# Patient Record
Sex: MALE | Race: WHITE | HISPANIC OR LATINO | URBAN - METROPOLITAN AREA
[De-identification: names, ages, dates, MRNs, and addresses within clinical notes are randomized per-mention and may not be internally consistent; named-entity substitution may affect disease eponyms.]

---

## 2023-05-24 PROBLEM — 197321007: Status: ACTIVE | Noted: 2023-05-24

## 2023-05-26 ENCOUNTER — WEB ENCOUNTER (OUTPATIENT)
Dept: URBAN - METROPOLITAN AREA CLINIC 63 | Facility: CLINIC | Age: 23
End: 2023-05-26

## 2023-05-26 ENCOUNTER — LAB OUTSIDE AN ENCOUNTER (OUTPATIENT)
Dept: URBAN - METROPOLITAN AREA CLINIC 63 | Facility: CLINIC | Age: 23
End: 2023-05-26

## 2023-05-26 ENCOUNTER — OFFICE VISIT (OUTPATIENT)
Dept: URBAN - METROPOLITAN AREA CLINIC 63 | Facility: CLINIC | Age: 23
End: 2023-05-26
Payer: MEDICAID

## 2023-05-26 VITALS
SYSTOLIC BLOOD PRESSURE: 100 MMHG | OXYGEN SATURATION: 99 % | HEART RATE: 75 BPM | DIASTOLIC BLOOD PRESSURE: 68 MMHG | WEIGHT: 178 LBS | TEMPERATURE: 97 F | BODY MASS INDEX: 23.59 KG/M2 | HEIGHT: 73 IN

## 2023-05-26 DIAGNOSIS — R10.9 ABDOMINAL CRAMPING: ICD-10-CM

## 2023-05-26 DIAGNOSIS — K21.9 GASTROESOPHAGEAL REFLUX DISEASE WITHOUT ESOPHAGITIS: ICD-10-CM

## 2023-05-26 DIAGNOSIS — R10.13 DYSPEPSIA: ICD-10-CM

## 2023-05-26 DIAGNOSIS — K92.1 HEMATOCHEZIA: ICD-10-CM

## 2023-05-26 DIAGNOSIS — R19.7 DIARRHEA, UNSPECIFIED TYPE: ICD-10-CM

## 2023-05-26 DIAGNOSIS — K76.0 FATTY LIVER: ICD-10-CM

## 2023-05-26 PROBLEM — 266435005: Status: ACTIVE | Noted: 2023-05-26

## 2023-05-26 PROCEDURE — 99204 OFFICE O/P NEW MOD 45 MIN: CPT | Performed by: PHYSICIAN ASSISTANT

## 2023-05-26 RX ORDER — ONDANSETRON HYDROCHLORIDE 4 MG/1
1 TABLET TABLET, FILM COATED ORAL
Qty: 2 | Refills: 0 | OUTPATIENT
Start: 2023-05-26

## 2023-05-26 RX ORDER — CHOLESTYRAMINE 4 G/9G
1 PACKET MIXED WITH WATER OR WITH MEAL. TAKE OTHER DRUGS AT LEAST 1 HR BEFORE OR 4-6 HOURS AFTER TAKING MEDICATION TO MINIMIZE INTERFERENCE POWDER, FOR SUSPENSION ORAL ONCE A DAY
Qty: 30 | Refills: 1 | OUTPATIENT
Start: 2023-05-26

## 2023-05-26 RX ORDER — DICYCLOMINE HYDROCHLORIDE 10 MG/1
1 CAPSULE AS NEEDED CAPSULE ORAL
Qty: 90 CAPSULE | Refills: 1 | OUTPATIENT
Start: 2023-05-26 | End: 2023-07-25

## 2023-05-26 RX ORDER — POLYETHYLENE GLYCOL 3350, SODIUM CHLORIDE, SODIUM BICARBONATE, POTASSIUM CHLORIDE 420; 11.2; 5.72; 1.48 G/4L; G/4L; G/4L; G/4L
AS DIRECTED POWDER, FOR SOLUTION ORAL ONCE
Qty: 4000 | Refills: 0 | OUTPATIENT
Start: 2023-05-26 | End: 2023-05-27

## 2023-05-26 RX ORDER — FAMOTIDINE 20 MG/1
1 TABLET AS NEEDED TABLET, FILM COATED ORAL TWICE A DAY
Qty: 60 TABLET | Refills: 2 | OUTPATIENT
Start: 2023-05-26

## 2023-05-26 NOTE — HPI-TODAY'S VISIT:
James is a pleasant 22-year-old male who presents today for evaluation of abdominal pain, fatty liver disease and functional dyspepsia.  Seen by PCP for bloating, gastric fullness, indigestion and dyspepsia.  Mentions difficulty finishing meals and diarrhea.    Labs dated 2/2/2023 showed a normal hemoglobin.  Normal platelets.  Normal renal function.  Normal LFTs.  Normal TSH.  H. pylori breath test negative  He had an EGD done a few years ago in [place] notable for gastritis. No record available for review. He notes post prandial fecal urgency and abdominal cramping. Minimal efficacy with PPI. Has noted improvement with probiotic. Patient is colonoscopy naive. Notes issues with diarrhea, approx 3 watery bowel movements per day. Apparently he had stool studies done which were negative. No record available for review. Diarrhea has been going on for 3 months. One episode of hematochezia. He notes recent US that showed fatty liver disease but no record available for review. Admits to early satiety and post prandial fullness. Occasional reflux. Denies nausea, vomiting, dysphagia, odynophagia, hematemesis, coffee ground emesis, abnormal weight loss or melena. Denies family history of colon cancer or colon polyps. No other GI complaints at this time

## 2023-05-31 ENCOUNTER — TELEPHONE ENCOUNTER (OUTPATIENT)
Dept: URBAN - METROPOLITAN AREA CLINIC 63 | Facility: CLINIC | Age: 23
End: 2023-05-31

## 2023-06-01 ENCOUNTER — CLAIMS CREATED FROM THE CLAIM WINDOW (OUTPATIENT)
Dept: URBAN - METROPOLITAN AREA SURGERY CENTER 4 | Facility: SURGERY CENTER | Age: 23
End: 2023-06-01
Payer: MEDICAID

## 2023-06-01 ENCOUNTER — CLAIMS CREATED FROM THE CLAIM WINDOW (OUTPATIENT)
Dept: URBAN - METROPOLITAN AREA CLINIC 4 | Facility: CLINIC | Age: 23
End: 2023-06-01
Payer: MEDICAID

## 2023-06-01 ENCOUNTER — TELEPHONE ENCOUNTER (OUTPATIENT)
Dept: URBAN - METROPOLITAN AREA CLINIC 63 | Facility: CLINIC | Age: 23
End: 2023-06-01

## 2023-06-01 VITALS — HEIGHT: 73 IN

## 2023-06-01 DIAGNOSIS — R10.13 EPIGASTRIC PAIN: ICD-10-CM

## 2023-06-01 DIAGNOSIS — K31.89 OTHER DISEASES OF STOMACH AND DUODENUM: ICD-10-CM

## 2023-06-01 DIAGNOSIS — K29.70 GASTRITIS: ICD-10-CM

## 2023-06-01 DIAGNOSIS — R10.13 DYSPEPSIA: ICD-10-CM

## 2023-06-01 DIAGNOSIS — K29.60 OTHER GASTRITIS WITHOUT BLEEDING: ICD-10-CM

## 2023-06-01 DIAGNOSIS — K20.80 ESOPHAGITIS, LOS ANGELES GRADE A: ICD-10-CM

## 2023-06-01 PROCEDURE — 43239 EGD BIOPSY SINGLE/MULTIPLE: CPT | Performed by: INTERNAL MEDICINE

## 2023-06-01 PROCEDURE — 88312 SPECIAL STAINS GROUP 1: CPT | Performed by: PATHOLOGY

## 2023-06-01 PROCEDURE — 88305 TISSUE EXAM BY PATHOLOGIST: CPT | Performed by: PATHOLOGY

## 2023-06-01 PROCEDURE — 00731 ANES UPR GI NDSC PX NOS: CPT | Performed by: NURSE ANESTHETIST, CERTIFIED REGISTERED

## 2023-06-01 RX ORDER — OMEPRAZOLE 20 MG/1
1 CAPSULE 30 MINUTES BEFORE MORNING MEAL CAPSULE, DELAYED RELEASE ORAL ONCE A DAY
Qty: 30 | Refills: 3 | OUTPATIENT
Start: 2023-06-01

## 2023-06-01 RX ORDER — FAMOTIDINE 20 MG/1
1 TABLET AS NEEDED TABLET, FILM COATED ORAL TWICE A DAY
Qty: 60 TABLET | Refills: 2 | Status: ACTIVE | COMMUNITY
Start: 2023-05-26

## 2023-06-01 RX ORDER — CHOLESTYRAMINE 4 G/9G
1 PACKET MIXED WITH WATER OR WITH MEAL. TAKE OTHER DRUGS AT LEAST 1 HR BEFORE OR 4-6 HOURS AFTER TAKING MEDICATION TO MINIMIZE INTERFERENCE POWDER, FOR SUSPENSION ORAL ONCE A DAY
Qty: 30 | Refills: 1 | Status: ACTIVE | COMMUNITY
Start: 2023-05-26

## 2023-06-01 RX ORDER — DICYCLOMINE HYDROCHLORIDE 10 MG/1
1 CAPSULE AS NEEDED CAPSULE ORAL
Qty: 90 CAPSULE | Refills: 1 | Status: ACTIVE | COMMUNITY
Start: 2023-05-26 | End: 2023-07-25

## 2023-06-01 RX ORDER — ONDANSETRON HYDROCHLORIDE 4 MG/1
1 TABLET TABLET, FILM COATED ORAL
Qty: 2 | Refills: 0 | Status: ACTIVE | COMMUNITY
Start: 2023-05-26

## 2023-06-02 ENCOUNTER — LAB OUTSIDE AN ENCOUNTER (OUTPATIENT)
Dept: URBAN - METROPOLITAN AREA CLINIC 63 | Facility: CLINIC | Age: 23
End: 2023-06-02

## 2023-06-22 ENCOUNTER — OFFICE VISIT (OUTPATIENT)
Dept: URBAN - METROPOLITAN AREA CLINIC 63 | Facility: CLINIC | Age: 23
End: 2023-06-22
Payer: MEDICAID

## 2023-06-22 ENCOUNTER — DASHBOARD ENCOUNTERS (OUTPATIENT)
Age: 23
End: 2023-06-22

## 2023-06-22 ENCOUNTER — LAB OUTSIDE AN ENCOUNTER (OUTPATIENT)
Dept: URBAN - METROPOLITAN AREA CLINIC 63 | Facility: CLINIC | Age: 23
End: 2023-06-22

## 2023-06-22 VITALS
TEMPERATURE: 98 F | HEART RATE: 75 BPM | WEIGHT: 171.6 LBS | OXYGEN SATURATION: 98 % | BODY MASS INDEX: 22.74 KG/M2 | HEIGHT: 73 IN | DIASTOLIC BLOOD PRESSURE: 70 MMHG | SYSTOLIC BLOOD PRESSURE: 110 MMHG

## 2023-06-22 DIAGNOSIS — R19.7 DIARRHEA, UNSPECIFIED TYPE: ICD-10-CM

## 2023-06-22 DIAGNOSIS — K92.1 HEMATOCHEZIA: ICD-10-CM

## 2023-06-22 DIAGNOSIS — K21.00 GASTRO-ESOPHAGEAL REFLUX DISEASE WITH ESOPHAGITIS, WITHOUT BLEEDING: ICD-10-CM

## 2023-06-22 DIAGNOSIS — K31.89 GASTRIC NODULE: ICD-10-CM

## 2023-06-22 DIAGNOSIS — K76.0 FATTY LIVER: ICD-10-CM

## 2023-06-22 DIAGNOSIS — R10.13 DYSPEPSIA: ICD-10-CM

## 2023-06-22 PROBLEM — 266433003: Status: ACTIVE | Noted: 2023-06-22

## 2023-06-22 PROCEDURE — 99213 OFFICE O/P EST LOW 20 MIN: CPT | Performed by: PHYSICIAN ASSISTANT

## 2023-06-22 RX ORDER — OMEPRAZOLE 20 MG/1
1 CAPSULE 30 MINUTES BEFORE MORNING MEAL CAPSULE, DELAYED RELEASE ORAL ONCE A DAY
Qty: 30 | Refills: 3 | Status: ACTIVE | COMMUNITY
Start: 2023-06-01

## 2023-06-22 NOTE — HPI-TODAY'S VISIT:
James is a pleasant 22-year-old male who presents today for a procedure follow up.   EGD dated 6/1/2023 showed a regular Z-line.  Gastritis.  Normal duodenum.  A single 8 mm submucosal papule (nodule) was found and biopsied in the stomach.  LA grade A esophagitis. No significant duodenal abnormality.  No significant abnormality in the antrum/body.  Squamous mucosa with focal erosion arising in a background of severe reflux type changes consistent with reflux associated erosive gastritis.  No evidence of Barraza's or EOE  Labs dated 6/7/2023 showed a negative hepatitis panel.    Labs dated 2/2/2023 showed a normal hemoglobin.  Normal platelets.  Normal renal function.  Normal LFTs.  Normal TSH.  H. pylori breath test negative  No issues after procedure.  Last visit admitted to postprandial fecal urgency and abdominal cramping.  Minimal efficacy with PPI therapy.  Noted improvement with probiotic.  Colonoscopy naive and plan last visit was to proceed with colonoscopy.  Patient initially agreeable but then canceled after his office visit.  He noted issues with diarrhea approximately 3 watery bowel movements per day.  Apparently he had stool studies done with his PCP which were negative but no records available for review despite medical records request.  Diarrhea had been going on for 3 months.  1 single episode of hematochezia.  He also noted a recent ultrasound that showed fatty liver disease but no records available for review.  FibroScan ordered last visit not completed.  In addition, he noted early satiety, occasional reflux and postprandial fullness. He was placed on famotidine 20 mg as needed, Questran and given Bentyl 10 mg as needed. After procedure he was started omeprazole 20 mg qd. Never started Bentyl. He notes only minimal episodes of diarrhea now, he believes triggered by [place] where he lives. Denies nausea, vomiting, heartburn, reflux, dysphagia, odynophagia, hematemesis, coffee ground emesis, abdominal pain, abnormal weight loss or melena. No further rectal bleeding. Denies family history of colon cancer or colon polyps. No other GI complaints at this time

## 2023-06-23 PROBLEM — 266433003: Status: ACTIVE | Noted: 2023-06-23

## 2023-07-21 ENCOUNTER — TELEPHONE ENCOUNTER (OUTPATIENT)
Dept: URBAN - METROPOLITAN AREA CLINIC 63 | Facility: CLINIC | Age: 23
End: 2023-07-21

## 2023-07-21 RX ORDER — CHOLESTYRAMINE 4 G/9G
1 PACKET MIXED WITH WATER OR WITH MEAL. TAKE OTHER DRUGS AT LEAST 1 HR BEFORE OR 4-6 HOURS AFTER TAKING MEDICATION TO MINIMIZE INTERFERENCE POWDER, FOR SUSPENSION ORAL ONCE A DAY
Qty: 30 | Refills: 1
Start: 2023-05-26